# Patient Record
Sex: MALE | Race: WHITE | URBAN - METROPOLITAN AREA
[De-identification: names, ages, dates, MRNs, and addresses within clinical notes are randomized per-mention and may not be internally consistent; named-entity substitution may affect disease eponyms.]

---

## 2020-06-22 ENCOUNTER — LAB OUTSIDE AN ENCOUNTER (OUTPATIENT)
Dept: URBAN - METROPOLITAN AREA CLINIC 98 | Facility: CLINIC | Age: 60
End: 2020-06-22

## 2020-06-25 LAB
DEAMIDATED GLIADIN ABS, IGA: 7
DEAMIDATED GLIADIN ABS, IGG: 2
ENDOMYSIAL ANTIBODY IGA: NEGATIVE
IMMUNOGLOBULIN A, QN, SERUM: 214
T-TRANSGLUTAMINASE (TTG) IGA: <2
T-TRANSGLUTAMINASE (TTG) IGG: <2

## 2020-07-20 ENCOUNTER — TELEPHONE ENCOUNTER (OUTPATIENT)
Dept: URBAN - METROPOLITAN AREA CLINIC 98 | Facility: CLINIC | Age: 60
End: 2020-07-20

## 2020-07-20 RX ORDER — RIFAXIMIN 550 MG/1
1 TABLET TABLET ORAL THREE TIMES A DAY
Qty: 42 TABLET | Refills: 0 | OUTPATIENT
Start: 2020-07-20 | End: 2020-08-03

## 2020-08-19 ENCOUNTER — ERX REFILL RESPONSE (OUTPATIENT)
Age: 60
End: 2020-08-19

## 2020-08-19 RX ORDER — HYOSCYAMINE SULFATE 0.12 MG/1
TAKE ONE TABLET BY MOUTH EVERY 4 HOURS AS NEEDED TABLET ORAL
Qty: 90 | Refills: 3

## 2020-10-20 ENCOUNTER — DASHBOARD ENCOUNTERS (OUTPATIENT)
Age: 60
End: 2020-10-20

## 2020-10-20 ENCOUNTER — WEB ENCOUNTER (OUTPATIENT)
Dept: URBAN - METROPOLITAN AREA CLINIC 98 | Facility: CLINIC | Age: 60
End: 2020-10-20

## 2020-10-20 ENCOUNTER — OFFICE VISIT (OUTPATIENT)
Dept: URBAN - METROPOLITAN AREA CLINIC 98 | Facility: CLINIC | Age: 60
End: 2020-10-20
Payer: COMMERCIAL

## 2020-10-20 VITALS
HEART RATE: 60 BPM | TEMPERATURE: 97 F | HEIGHT: 73 IN | DIASTOLIC BLOOD PRESSURE: 82 MMHG | BODY MASS INDEX: 32.1 KG/M2 | WEIGHT: 242.2 LBS | SYSTOLIC BLOOD PRESSURE: 128 MMHG

## 2020-10-20 DIAGNOSIS — D12.6 ADENOMATOUS POLYP OF COLON, UNSPECIFIED PART OF COLON: ICD-10-CM

## 2020-10-20 DIAGNOSIS — K21.00 GASTROESOPHAGEAL REFLUX DISEASE WITH ESOPHAGITIS WITHOUT HEMORRHAGE: ICD-10-CM

## 2020-10-20 DIAGNOSIS — K58.0 IRRITABLE BOWEL SYNDROME WITH DIARRHEA: ICD-10-CM

## 2020-10-20 PROBLEM — 197125005: Status: ACTIVE | Noted: 2020-10-20

## 2020-10-20 PROCEDURE — 1036F TOBACCO NON-USER: CPT | Performed by: INTERNAL MEDICINE

## 2020-10-20 PROCEDURE — G8417 CALC BMI ABV UP PARAM F/U: HCPCS | Performed by: INTERNAL MEDICINE

## 2020-10-20 PROCEDURE — 99214 OFFICE O/P EST MOD 30 MIN: CPT | Performed by: INTERNAL MEDICINE

## 2020-10-20 PROCEDURE — 3017F COLORECTAL CA SCREEN DOC REV: CPT | Performed by: INTERNAL MEDICINE

## 2020-10-20 RX ORDER — LORAZEPAM 1 MG/1
TABLET ORAL
Qty: 0 | Refills: 0 | Status: ACTIVE | COMMUNITY
Start: 1900-01-01

## 2020-10-20 RX ORDER — SODIUM, POTASSIUM,MAG SULFATES 17.5-3.13G
17.5-13.3-1.6 GM/177ML SOLUTION, RECONSTITUTED, ORAL ORAL
Qty: 1 BOX | Refills: 0 | OUTPATIENT
Start: 2020-10-20

## 2020-10-20 RX ORDER — HYOSCYAMINE SULFATE 0.38 MG/1
TAKE 1 TABLET (0.375 MG) BY ORAL ROUTE EVERY NIGHT AT BEDTIME TABLET, EXTENDED RELEASE ORAL 2
Qty: 30 | Refills: 5 | Status: ACTIVE | COMMUNITY
Start: 2017-11-14

## 2020-10-20 RX ORDER — HYOSCYAMINE SULFATE 0.12 MG/1
TAKE ONE TABLET BY MOUTH EVERY 4 HOURS AS NEEDED TABLET ORAL
Qty: 90 | Refills: 3 | Status: ACTIVE | COMMUNITY

## 2020-10-20 NOTE — HPI-OTHER HISTORIES
Colon and EGD done in 2017. Has had polyps in the past. Diagnosed to have IBS in the past. Sister with celiac disease. Using probiotics and apple cider vinegar with improvement. Last 2 months- had a CIBH- more loose BM and thinner caliber. Started back on probiotics and improved. Does get gurgling though. More flatulence as well. No family history of colon cancer. No recent abx use. Had CCY and ercp times 2 in the past.  Present- Did do the xifaxan - been off for 2 months.  on VSL - improved but not all the way Still gets some gurgling and cramp on the left side.  Still with some thinner BM Lost about 10 lbs - intentional.

## 2020-12-10 ENCOUNTER — OFFICE VISIT (OUTPATIENT)
Dept: URBAN - METROPOLITAN AREA SURGERY CENTER 18 | Facility: SURGERY CENTER | Age: 60
End: 2020-12-10

## 2020-12-11 ENCOUNTER — TELEPHONE ENCOUNTER (OUTPATIENT)
Dept: URBAN - METROPOLITAN AREA CLINIC 98 | Facility: CLINIC | Age: 60
End: 2020-12-11

## 2021-01-19 ENCOUNTER — OFFICE VISIT (OUTPATIENT)
Dept: URBAN - METROPOLITAN AREA SURGERY CENTER 18 | Facility: SURGERY CENTER | Age: 61
End: 2021-01-19
Payer: COMMERCIAL

## 2021-01-19 ENCOUNTER — TELEPHONE ENCOUNTER (OUTPATIENT)
Dept: URBAN - METROPOLITAN AREA CLINIC 98 | Facility: CLINIC | Age: 61
End: 2021-01-19

## 2021-01-19 DIAGNOSIS — Z12.11 COLON CANCER SCREENING: ICD-10-CM

## 2021-01-19 PROCEDURE — 992 NON-BILLABLE: Performed by: INTERNAL MEDICINE

## 2021-01-19 RX ORDER — HYOSCYAMINE SULFATE 0.38 MG/1
TAKE 1 TABLET (0.375 MG) BY ORAL ROUTE EVERY NIGHT AT BEDTIME TABLET, EXTENDED RELEASE ORAL 2
Qty: 30 | Refills: 5 | Status: ACTIVE | COMMUNITY
Start: 2017-11-14

## 2021-01-19 RX ORDER — LORAZEPAM 1 MG/1
TABLET ORAL
Qty: 0 | Refills: 0 | Status: ACTIVE | COMMUNITY
Start: 1900-01-01

## 2021-01-19 RX ORDER — HYOSCYAMINE SULFATE 0.12 MG/1
TAKE ONE TABLET BY MOUTH EVERY 4 HOURS AS NEEDED TABLET ORAL
Qty: 90 | Refills: 3 | Status: ACTIVE | COMMUNITY

## 2021-01-19 RX ORDER — SODIUM, POTASSIUM,MAG SULFATES 17.5-3.13G
17.5-13.3-1.6 GM/177ML SOLUTION, RECONSTITUTED, ORAL ORAL
Qty: 1 BOX | Refills: 0 | Status: ACTIVE | COMMUNITY
Start: 2020-10-20

## 2021-03-23 ENCOUNTER — TELEPHONE ENCOUNTER (OUTPATIENT)
Dept: URBAN - METROPOLITAN AREA CLINIC 23 | Facility: CLINIC | Age: 61
End: 2021-03-23

## 2021-03-23 RX ORDER — SODIUM, POTASSIUM,MAG SULFATES 17.5-3.13G
354ML SOLUTION, RECONSTITUTED, ORAL ORAL
Qty: 354 MILLILITER | Refills: 0 | OUTPATIENT
Start: 2021-03-25 | End: 2021-03-26

## 2021-04-27 ENCOUNTER — OFFICE VISIT (OUTPATIENT)
Dept: URBAN - METROPOLITAN AREA SURGERY CENTER 18 | Facility: SURGERY CENTER | Age: 61
End: 2021-04-27

## 2021-05-20 ENCOUNTER — OFFICE VISIT (OUTPATIENT)
Dept: URBAN - METROPOLITAN AREA SURGERY CENTER 18 | Facility: SURGERY CENTER | Age: 61
End: 2021-05-20
Payer: COMMERCIAL

## 2021-05-20 DIAGNOSIS — D12.3 ADENOMA OF TRANSVERSE COLON: ICD-10-CM

## 2021-05-20 DIAGNOSIS — K63.5 BENIGN COLON POLYP: ICD-10-CM

## 2021-05-20 DIAGNOSIS — Z86.010 H/O ADENOMATOUS POLYP OF COLON: ICD-10-CM

## 2021-05-20 DIAGNOSIS — D12.0 ADENOMA OF CECUM: ICD-10-CM

## 2021-05-20 PROCEDURE — 45380 COLONOSCOPY AND BIOPSY: CPT | Performed by: INTERNAL MEDICINE

## 2021-05-20 PROCEDURE — G8907 PT DOC NO EVENTS ON DISCHARG: HCPCS | Performed by: INTERNAL MEDICINE

## 2021-05-20 PROCEDURE — 45385 COLONOSCOPY W/LESION REMOVAL: CPT | Performed by: INTERNAL MEDICINE

## 2021-05-20 RX ORDER — HYOSCYAMINE SULFATE 0.38 MG/1
TAKE 1 TABLET (0.375 MG) BY ORAL ROUTE EVERY NIGHT AT BEDTIME TABLET, EXTENDED RELEASE ORAL 2
Qty: 30 | Refills: 5 | Status: ACTIVE | COMMUNITY
Start: 2017-11-14

## 2021-05-20 RX ORDER — HYOSCYAMINE SULFATE 0.12 MG/1
TAKE ONE TABLET BY MOUTH EVERY 4 HOURS AS NEEDED TABLET ORAL
Qty: 90 | Refills: 3 | Status: ACTIVE | COMMUNITY

## 2021-05-20 RX ORDER — SODIUM, POTASSIUM,MAG SULFATES 17.5-3.13G
17.5-13.3-1.6 GM/177ML SOLUTION, RECONSTITUTED, ORAL ORAL
Qty: 1 BOX | Refills: 0 | Status: ACTIVE | COMMUNITY
Start: 2020-10-20

## 2021-05-20 RX ORDER — LORAZEPAM 1 MG/1
TABLET ORAL
Qty: 0 | Refills: 0 | Status: ACTIVE | COMMUNITY
Start: 1900-01-01

## 2021-08-04 ENCOUNTER — WEB ENCOUNTER (OUTPATIENT)
Dept: URBAN - METROPOLITAN AREA CLINIC 98 | Facility: CLINIC | Age: 61
End: 2021-08-04

## 2021-08-04 ENCOUNTER — TELEPHONE ENCOUNTER (OUTPATIENT)
Dept: URBAN - METROPOLITAN AREA CLINIC 98 | Facility: CLINIC | Age: 61
End: 2021-08-04

## 2021-08-04 RX ORDER — HYOSCYAMINE SULFATE 0.12 MG/1
TAKE ONE TABLET BY MOUTH EVERY 4 HOURS AS NEEDED TABLET ORAL
Qty: 90 | Refills: 3

## 2021-10-01 ENCOUNTER — ERX REFILL RESPONSE (OUTPATIENT)
Dept: URBAN - METROPOLITAN AREA CLINIC 98 | Facility: CLINIC | Age: 61
End: 2021-10-01

## 2021-10-01 RX ORDER — HYOSCYAMINE SULFATE 0.12 MG/1
TAKE ONE TABLET BY MOUTH EVERY 4 HOURS AS NEEDED TABLET ORAL
Qty: 90 TABLET | Refills: 1 | OUTPATIENT

## 2021-12-27 ENCOUNTER — ERX REFILL RESPONSE (OUTPATIENT)
Dept: URBAN - METROPOLITAN AREA CLINIC 98 | Facility: CLINIC | Age: 61
End: 2021-12-27

## 2021-12-27 RX ORDER — HYOSCYAMINE SULFATE 0.12 MG/1
TAKE ONE TABLET BY MOUTH EVERY 4 HOURS AS NEEDED TABLET ORAL
Qty: 90 TABLET | Refills: 1 | OUTPATIENT

## 2022-03-14 ENCOUNTER — ERX REFILL RESPONSE (OUTPATIENT)
Dept: URBAN - METROPOLITAN AREA CLINIC 98 | Facility: CLINIC | Age: 62
End: 2022-03-14

## 2022-03-14 RX ORDER — HYOSCYAMINE SULFATE 0.12 MG/1
TAKE ONE TABLET BY MOUTH EVERY 4 HOURS AS NEEDED FOR 15 DAYS TABLET ORAL
Qty: 90 TABLET | Refills: 1 | OUTPATIENT

## 2022-06-22 ENCOUNTER — ERX REFILL RESPONSE (OUTPATIENT)
Dept: URBAN - METROPOLITAN AREA CLINIC 98 | Facility: CLINIC | Age: 62
End: 2022-06-22

## 2022-06-22 RX ORDER — HYOSCYAMINE SULFATE 0.12 MG/1
TAKE ONE TABLET BY MOUTH EVERY 4 HOURS AS NEEDED FOR 15 DAYS TABLET ORAL
Qty: 90 TABLET | Refills: 1 | OUTPATIENT

## 2022-06-22 RX ORDER — HYOSCYAMINE SULFATE 0.12 MG/1
TAKE ONE TABLET BY MOUTH EVERY 4 HOURS AS NEEDED FOR 15 DAYS TABLET ORAL
Qty: 90 TABLET | Refills: 0 | OUTPATIENT

## 2022-08-30 ENCOUNTER — ERX REFILL RESPONSE (OUTPATIENT)
Dept: URBAN - METROPOLITAN AREA CLINIC 98 | Facility: CLINIC | Age: 62
End: 2022-08-30

## 2022-08-30 RX ORDER — HYOSCYAMINE SULFATE 0.12 MG/1
TAKE ONE TABLET BY MOUTH EVERY 4 HOURS AS NEEDED FOR 15 DAYS TABLET ORAL
Qty: 90 TABLET | Refills: 0 | OUTPATIENT

## 2022-11-11 ENCOUNTER — ERX REFILL RESPONSE (OUTPATIENT)
Dept: URBAN - METROPOLITAN AREA CLINIC 98 | Facility: CLINIC | Age: 62
End: 2022-11-11

## 2022-11-11 RX ORDER — HYOSCYAMINE SULFATE 0.12 MG/1
TAKE ONE TABLET BY MOUTH EVERY 4 HOURS AS NEEDED FOR 15 DAYS TABLET ORAL
Qty: 90 TABLET | Refills: 0 | OUTPATIENT

## 2022-11-11 RX ORDER — HYOSCYAMINE SULFATE 0.12 MG/1
TAKE ONE TABLET BY MOUTH EVERY 4 HOURS AS NEEDED TABLET ORAL
Qty: 90 TABLET | Refills: 0 | OUTPATIENT

## 2025-01-16 ENCOUNTER — OFFICE VISIT (OUTPATIENT)
Dept: URBAN - METROPOLITAN AREA CLINIC 98 | Facility: CLINIC | Age: 65
End: 2025-01-16
Payer: MEDICARE

## 2025-01-16 ENCOUNTER — OFFICE VISIT (OUTPATIENT)
Dept: URBAN - METROPOLITAN AREA CLINIC 98 | Facility: CLINIC | Age: 65
End: 2025-01-16

## 2025-01-16 ENCOUNTER — LAB OUTSIDE AN ENCOUNTER (OUTPATIENT)
Dept: URBAN - METROPOLITAN AREA CLINIC 98 | Facility: CLINIC | Age: 65
End: 2025-01-16

## 2025-01-16 VITALS
HEART RATE: 70 BPM | DIASTOLIC BLOOD PRESSURE: 86 MMHG | TEMPERATURE: 97.1 F | WEIGHT: 249 LBS | SYSTOLIC BLOOD PRESSURE: 139 MMHG | BODY MASS INDEX: 33 KG/M2 | HEIGHT: 73 IN

## 2025-01-16 DIAGNOSIS — K21.9 GASTROESOPHAGEAL REFLUX DISEASE, UNSPECIFIED WHETHER ESOPHAGITIS PRESENT: ICD-10-CM

## 2025-01-16 DIAGNOSIS — Z86.0101 PERSONAL HISTORY OF ADENOMATOUS AND SERRATED COLON POLYPS: ICD-10-CM

## 2025-01-16 DIAGNOSIS — Z79.85 LONG-TERM (CURRENT) USE OF INJECTABLE NON-INSULIN ANTIDIABETIC DRUGS: ICD-10-CM

## 2025-01-16 DIAGNOSIS — D64.9 ANEMIA, UNSPECIFIED TYPE: ICD-10-CM

## 2025-01-16 PROBLEM — 271737000: Status: ACTIVE | Noted: 2025-01-16

## 2025-01-16 PROBLEM — 235595009: Status: ACTIVE | Noted: 2025-01-16

## 2025-01-16 PROCEDURE — 99203 OFFICE O/P NEW LOW 30 MIN: CPT

## 2025-01-16 RX ORDER — HYOSCYAMINE SULFATE 0.12 MG/1
TAKE ONE TABLET BY MOUTH EVERY 4 HOURS AS NEEDED TABLET ORAL
Qty: 90 | Refills: 3 | Status: ON HOLD | COMMUNITY

## 2025-01-16 RX ORDER — SODIUM, POTASSIUM,MAG SULFATES 17.5-3.13G
17.5-13.3-1.6 GM/177ML SOLUTION, RECONSTITUTED, ORAL ORAL
Qty: 1 BOX | Refills: 0 | Status: ON HOLD | COMMUNITY
Start: 2020-10-20

## 2025-01-16 RX ORDER — LORAZEPAM 1 MG/1
TABLET ORAL
Qty: 0 | Refills: 0 | Status: ACTIVE | COMMUNITY
Start: 1900-01-01

## 2025-01-16 RX ORDER — HYOSCYAMINE SULFATE 0.12 MG/1
TAKE ONE TABLET BY MOUTH EVERY 4 HOURS AS NEEDED TABLET ORAL
Qty: 90 TABLET | Refills: 0 | Status: ON HOLD | COMMUNITY

## 2025-01-16 RX ORDER — HYOSCYAMINE SULFATE 0.38 MG/1
TAKE 1 TABLET (0.375 MG) BY ORAL ROUTE EVERY NIGHT AT BEDTIME TABLET, EXTENDED RELEASE ORAL 2
Qty: 30 | Refills: 5 | Status: ON HOLD | COMMUNITY
Start: 2017-11-14

## 2025-01-16 NOTE — HPI-TODAY'S VISIT:
1/16/25- Brittany Woods, NP - 66 yo male here to schedule surveillance colonoscopy/EGD - PCP Dr. Andrez Aldana - BM 3-4 times per week - Stools hard with ozempic - great weight loss with ozempic past year; lost 15 pounds - Denies bright red blood, melena, or mucus - No nausea, vomiting, heartburn, dysphagia - Takes nexium 20 mg in the morning, 40 mg at night for years Reviewed - Colonoscopy 5/2021- Two 2 to 3 mm polyps in the cecum, removed with a cold biopsy forceps. Resected and retrieved. Two 3 to 4 mm polyps at the splenic flexure and in the transverse colon, removed with a cold biopsy forceps. Resected and retrieved. One 5 mm polyp in the sigmoid colon, removed with a cold snare. Resected and retrieved. Diverticulosis in the sigmoid colon. The examination was otherwise normal.  Non-bleeding internal hemorrhoids - Pathology- TA polyps - EGD 2017- bx no mohan's esophagus; reflux type changes

## 2025-02-25 ENCOUNTER — OFFICE VISIT (OUTPATIENT)
Dept: URBAN - METROPOLITAN AREA SURGERY CENTER 18 | Facility: SURGERY CENTER | Age: 65
End: 2025-02-25

## 2025-03-24 ENCOUNTER — CLAIMS CREATED FROM THE CLAIM WINDOW (OUTPATIENT)
Dept: URBAN - METROPOLITAN AREA SURGERY CENTER 18 | Facility: SURGERY CENTER | Age: 65
End: 2025-03-24
Payer: MEDICARE

## 2025-03-24 ENCOUNTER — CLAIMS CREATED FROM THE CLAIM WINDOW (OUTPATIENT)
Dept: URBAN - METROPOLITAN AREA CLINIC 4 | Facility: CLINIC | Age: 65
End: 2025-03-24
Payer: MEDICARE

## 2025-03-24 DIAGNOSIS — Z86.0101 H/O ADENOMATOUS POLYP OF COLON: ICD-10-CM

## 2025-03-24 DIAGNOSIS — D12.2 ADENOMA OF ASCENDING COLON: ICD-10-CM

## 2025-03-24 DIAGNOSIS — K21.9 ACID REFLUX: ICD-10-CM

## 2025-03-24 DIAGNOSIS — Z12.11 COLON CANCER SCREENING (HIGH RISK): ICD-10-CM

## 2025-03-24 DIAGNOSIS — K21.9 GASTRO-ESOPHAGEAL REFLUX DISEASE WITHOUT ESOPHAGITIS: ICD-10-CM

## 2025-03-24 DIAGNOSIS — K31.89 OTHER DISEASES OF STOMACH AND DUODENUM: ICD-10-CM

## 2025-03-24 DIAGNOSIS — K31.7 POLYP OF STOMACH AND DUODENUM: ICD-10-CM

## 2025-03-24 DIAGNOSIS — K29.70 GASTRITIS: ICD-10-CM

## 2025-03-24 DIAGNOSIS — K29.70 GASTRITIS, UNSPECIFIED, WITHOUT BLEEDING: ICD-10-CM

## 2025-03-24 DIAGNOSIS — K31.7 GASTRIC POLYPS: ICD-10-CM

## 2025-03-24 DIAGNOSIS — Z86.0100 PERSONAL HISTORY OF COLONIC POLYPS: ICD-10-CM

## 2025-03-24 PROCEDURE — 88305 TISSUE EXAM BY PATHOLOGIST: CPT | Performed by: PATHOLOGY

## 2025-03-24 PROCEDURE — 00813 ANES UPR LWR GI NDSC PX: CPT | Performed by: NURSE ANESTHETIST, CERTIFIED REGISTERED

## 2025-03-24 PROCEDURE — 45385 COLONOSCOPY W/LESION REMOVAL: CPT | Performed by: INTERNAL MEDICINE

## 2025-03-24 PROCEDURE — 43239 EGD BIOPSY SINGLE/MULTIPLE: CPT | Performed by: INTERNAL MEDICINE

## 2025-03-24 PROCEDURE — 88312 SPECIAL STAINS GROUP 1: CPT | Performed by: PATHOLOGY

## 2025-03-24 RX ORDER — LORAZEPAM 1 MG/1
TABLET ORAL
Qty: 0 | Refills: 0 | Status: ACTIVE | COMMUNITY
Start: 1900-01-01

## 2025-03-24 RX ORDER — HYOSCYAMINE SULFATE 0.12 MG/1
TAKE ONE TABLET BY MOUTH EVERY 4 HOURS AS NEEDED TABLET ORAL
Qty: 90 | Refills: 3 | Status: ON HOLD | COMMUNITY

## 2025-03-24 RX ORDER — SODIUM, POTASSIUM,MAG SULFATES 17.5-3.13G
17.5-13.3-1.6 GM/177ML SOLUTION, RECONSTITUTED, ORAL ORAL
Qty: 1 BOX | Refills: 0 | Status: ON HOLD | COMMUNITY
Start: 2020-10-20

## 2025-03-24 RX ORDER — HYOSCYAMINE SULFATE 0.38 MG/1
TAKE 1 TABLET (0.375 MG) BY ORAL ROUTE EVERY NIGHT AT BEDTIME TABLET, EXTENDED RELEASE ORAL 2
Qty: 30 | Refills: 5 | Status: ON HOLD | COMMUNITY
Start: 2017-11-14

## 2025-03-24 RX ORDER — HYOSCYAMINE SULFATE 0.12 MG/1
TAKE ONE TABLET BY MOUTH EVERY 4 HOURS AS NEEDED TABLET ORAL
Qty: 90 TABLET | Refills: 0 | Status: ON HOLD | COMMUNITY